# Patient Record
Sex: MALE | Race: WHITE | NOT HISPANIC OR LATINO | Employment: FULL TIME | URBAN - METROPOLITAN AREA
[De-identification: names, ages, dates, MRNs, and addresses within clinical notes are randomized per-mention and may not be internally consistent; named-entity substitution may affect disease eponyms.]

---

## 2018-07-31 ENCOUNTER — OFFICE VISIT CONVERTED (OUTPATIENT)
Dept: UROLOGY | Facility: CLINIC | Age: 33
End: 2018-07-31
Attending: UROLOGY

## 2018-08-10 ENCOUNTER — OFFICE VISIT CONVERTED (OUTPATIENT)
Dept: UROLOGY | Facility: CLINIC | Age: 33
End: 2018-08-10
Attending: UROLOGY

## 2021-05-16 VITALS
BODY MASS INDEX: 27.32 KG/M2 | TEMPERATURE: 97.8 F | SYSTOLIC BLOOD PRESSURE: 126 MMHG | HEART RATE: 107 BPM | HEIGHT: 66 IN | WEIGHT: 170 LBS | DIASTOLIC BLOOD PRESSURE: 72 MMHG

## 2021-05-16 VITALS
HEART RATE: 83 BPM | TEMPERATURE: 98.3 F | DIASTOLIC BLOOD PRESSURE: 95 MMHG | BODY MASS INDEX: 26.68 KG/M2 | HEIGHT: 66 IN | SYSTOLIC BLOOD PRESSURE: 124 MMHG | WEIGHT: 166 LBS

## 2023-04-12 NOTE — PROGRESS NOTES
Chief Complaint: Testicle Pain    Subjective         History of Present Illness  Nikita Thakur is a 37 y.o. male presents to [facility] to be seen for right testicular pain.    Patient was seen by his PCP on 3/20/2023 with complaints of scrotal pain.  Patient reported that he had been seen in [facility] 3 days prior to this visit for an increase in pain.  He was referred here for evaluation.  He was started on doxycycline for 10 days.     Patient reports he was off for surgery in his right ear and while he was at home he started having pain in right testicle. He reports the pain started going up his right groin and down his right leg. He reports every test in ER was negative. He was told was a type of infection. He was advised to elevate testicles and ice them. He then had a CT scan that was negative. He reports the pain is better but still there when he sits down. He completed doxycycline 1-2 weeks ago.     Denies urinary symptoms    Denies perineal pain    Denies dysuria    Denies GH     surgeries- denies    Family history of  malignancy- denies    Cardiopulmonary- denies    Anticoagulants- denies    Smoker- denies    Ultrasound testicles and scrotum  3/17/2023 normal size of right testicle.  No demonstrated right testicular mass or cyst.  Normal arterial and normal venous vascularity.  The epididymis is normal in size.  Normal vascularity of the epididymis.  No demonstrated epididymal cystic structure.  No demonstrated hydrocele.  No demonstrated varicocele.  No extratesticular mass or cyst.  Left testicle normal size of the left testicle.  Normal arterial and normal venous vascularity.  No demonstrated left testicular mass or cyst.  Epididymis is normal in size.  There is normal vascularity of the epididymis.  There is no demonstrated epididymal cystic structure.  There is no demonstrated hydrocele.  There is prominent extratesticular veins consistent with a varicocele.  There  "is no demonstrated extratesticular mass or cyst    Objective     History reviewed. No pertinent past medical history.    History reviewed. No pertinent surgical history.      Current Outpatient Medications:   •  insulin NPH (NovoLIN N) 100 UNIT/ML injection, 32 units Subcutaneous bid, Disp: , Rfl:   •  insulin regular (NovoLIN R) 100 UNIT/ML injection, 6 units Injection bid, Disp: , Rfl:     Allergies   Allergen Reactions   • Cephalexin Unknown - Low Severity   • Sulfamethoxazole-Trimethoprim Unknown - Low Severity        No family history on file.    Social History     Socioeconomic History   • Marital status:    Tobacco Use   • Smoking status: Never     Passive exposure: Never   • Smokeless tobacco: Never   Vaping Use   • Vaping Use: Never used       Vital Signs:   Resp 16   Ht 167.6 cm (66\")   Wt 85.8 kg (189 lb 3.2 oz)   BMI 30.54 kg/m²      Physical Exam  Vitals reviewed.   Constitutional:       Appearance: Normal appearance.   Abdominal:      Hernia: There is no hernia in the left inguinal area or right inguinal area.   Genitourinary:     Penis: Circumcised.       Testes: Normal.         Right: Swelling not present.         Left: Swelling not present.      Epididymis:      Right: Normal.      Left: Normal.   Skin:     General: Skin is warm and dry.   Neurological:      General: No focal deficit present.      Mental Status: He is alert and oriented to person, place, and time.   Psychiatric:         Mood and Affect: Mood normal.         Behavior: Behavior normal.          Result Review :   The following data was reviewed by: TRACEY Worthy on 04/21/2023:  No results found for this or any previous visit.         Procedures        Assessment and Plan    Diagnoses and all orders for this visit:    1. Scrotal pain (Primary)    2. Varicocele    Since patient's pain seems to have improved, and his ultrasound was negative except for left varicocele, we will just follow-up as needed with him.  He " does have a longstanding history of back pain and he and his PCP are speculating that the pain in his right groin could be from his back pain.  No inguinal hernia appreciated on exam today or on CT that he had previously completed on 4/11/2023.    He can make an appointment for follow-up if his pain returns    Follow Up   Return if symptoms worsen or fail to improve.  Patient was given instructions and counseling regarding his condition or for health maintenance advice. Please see specific information pulled into the AVS if appropriate.         This document has been electronically signed by TRACEY Worthy  April 21, 2023 14:51 EDT

## 2023-04-21 ENCOUNTER — OFFICE VISIT (OUTPATIENT)
Dept: UROLOGY | Facility: CLINIC | Age: 38
End: 2023-04-21
Payer: COMMERCIAL

## 2023-04-21 VITALS — WEIGHT: 189.2 LBS | BODY MASS INDEX: 30.41 KG/M2 | HEIGHT: 66 IN | RESPIRATION RATE: 16 BRPM

## 2023-04-21 DIAGNOSIS — I86.1 VARICOCELE: ICD-10-CM

## 2023-04-21 DIAGNOSIS — N50.82 SCROTAL PAIN: Primary | ICD-10-CM

## 2023-04-21 RX ORDER — HUMAN INSULIN 100 [IU]/ML
INJECTION, SOLUTION SUBCUTANEOUS
COMMUNITY

## 2023-04-21 RX ORDER — HUMAN INSULIN 100 [IU]/ML
INJECTION, SUSPENSION SUBCUTANEOUS
COMMUNITY

## 2024-08-22 ENCOUNTER — HOSPITAL ENCOUNTER (EMERGENCY)
Facility: HOSPITAL | Age: 39
Discharge: HOME | End: 2024-08-22
Attending: EMERGENCY MEDICINE
Payer: COMMERCIAL

## 2024-08-22 ENCOUNTER — APPOINTMENT (OUTPATIENT)
Dept: CT IMAGING | Facility: HOSPITAL | Age: 39
End: 2024-08-22
Payer: COMMERCIAL

## 2024-08-22 VITALS
HEIGHT: 66 IN | OXYGEN SATURATION: 97 % | DIASTOLIC BLOOD PRESSURE: 78 MMHG | RESPIRATION RATE: 18 BRPM | WEIGHT: 189.6 LBS | TEMPERATURE: 97.8 F | BODY MASS INDEX: 30.47 KG/M2 | SYSTOLIC BLOOD PRESSURE: 125 MMHG | HEART RATE: 79 BPM

## 2024-08-22 DIAGNOSIS — R10.10 UPPER ABDOMINAL PAIN: Primary | ICD-10-CM

## 2024-08-22 LAB
ALBUMIN SERPL-MCNC: 3.9 G/DL (ref 3.5–5.2)
ALBUMIN/GLOB SERPL: 1.2 G/DL
ALP SERPL-CCNC: 68 U/L (ref 39–117)
ALT SERPL W P-5'-P-CCNC: 31 U/L (ref 1–41)
ANION GAP SERPL CALCULATED.3IONS-SCNC: 10.1 MMOL/L (ref 5–15)
AST SERPL-CCNC: 19 U/L (ref 1–40)
BASOPHILS # BLD AUTO: 0.05 10*3/MM3 (ref 0–0.2)
BASOPHILS NFR BLD AUTO: 1 % (ref 0–1.5)
BILIRUB SERPL-MCNC: 0.3 MG/DL (ref 0–1.2)
BILIRUB UR QL STRIP: NEGATIVE
BUN SERPL-MCNC: 10 MG/DL (ref 6–20)
BUN/CREAT SERPL: 11.4 (ref 7–25)
CALCIUM SPEC-SCNC: 8.8 MG/DL (ref 8.6–10.5)
CHLORIDE SERPL-SCNC: 104 MMOL/L (ref 98–107)
CLARITY UR: CLEAR
CO2 SERPL-SCNC: 26.9 MMOL/L (ref 22–29)
COLOR UR: YELLOW
CREAT SERPL-MCNC: 0.88 MG/DL (ref 0.76–1.27)
DEPRECATED RDW RBC AUTO: 38.8 FL (ref 37–54)
EGFRCR SERPLBLD CKD-EPI 2021: 112.9 ML/MIN/1.73
EOSINOPHIL # BLD AUTO: 0.25 10*3/MM3 (ref 0–0.4)
EOSINOPHIL NFR BLD AUTO: 5.1 % (ref 0.3–6.2)
ERYTHROCYTE [DISTWIDTH] IN BLOOD BY AUTOMATED COUNT: 12.5 % (ref 12.3–15.4)
GLOBULIN UR ELPH-MCNC: 3.2 GM/DL
GLUCOSE SERPL-MCNC: 172 MG/DL (ref 65–99)
GLUCOSE UR STRIP-MCNC: ABNORMAL MG/DL
HCT VFR BLD AUTO: 48.5 % (ref 37.5–51)
HGB BLD-MCNC: 16.2 G/DL (ref 13–17.7)
HGB UR QL STRIP.AUTO: NEGATIVE
HOLD SPECIMEN: NORMAL
HOLD SPECIMEN: NORMAL
IMM GRANULOCYTES # BLD AUTO: 0.01 10*3/MM3 (ref 0–0.05)
IMM GRANULOCYTES NFR BLD AUTO: 0.2 % (ref 0–0.5)
KETONES UR QL STRIP: ABNORMAL
LEUKOCYTE ESTERASE UR QL STRIP.AUTO: NEGATIVE
LIPASE SERPL-CCNC: 14 U/L (ref 13–60)
LYMPHOCYTES # BLD AUTO: 1.56 10*3/MM3 (ref 0.7–3.1)
LYMPHOCYTES NFR BLD AUTO: 32 % (ref 19.6–45.3)
MAGNESIUM SERPL-MCNC: 1.9 MG/DL (ref 1.6–2.6)
MCH RBC QN AUTO: 28.2 PG (ref 26.6–33)
MCHC RBC AUTO-ENTMCNC: 33.4 G/DL (ref 31.5–35.7)
MCV RBC AUTO: 84.5 FL (ref 79–97)
MONOCYTES # BLD AUTO: 0.53 10*3/MM3 (ref 0.1–0.9)
MONOCYTES NFR BLD AUTO: 10.9 % (ref 5–12)
NEUTROPHILS NFR BLD AUTO: 2.47 10*3/MM3 (ref 1.7–7)
NEUTROPHILS NFR BLD AUTO: 50.8 % (ref 42.7–76)
NITRITE UR QL STRIP: NEGATIVE
NRBC BLD AUTO-RTO: 0 /100 WBC (ref 0–0.2)
PH UR STRIP.AUTO: 5.5 [PH] (ref 5–8)
PLATELET # BLD AUTO: 289 10*3/MM3 (ref 140–450)
PMV BLD AUTO: 8.9 FL (ref 6–12)
POTASSIUM SERPL-SCNC: 4.7 MMOL/L (ref 3.5–5.2)
PROT SERPL-MCNC: 7.1 G/DL (ref 6–8.5)
PROT UR QL STRIP: NEGATIVE
RBC # BLD AUTO: 5.74 10*6/MM3 (ref 4.14–5.8)
SODIUM SERPL-SCNC: 141 MMOL/L (ref 136–145)
SP GR UR STRIP: >1.03 (ref 1–1.03)
UROBILINOGEN UR QL STRIP: ABNORMAL
WBC NRBC COR # BLD AUTO: 4.87 10*3/MM3 (ref 3.4–10.8)
WHOLE BLOOD HOLD COAG: NORMAL
WHOLE BLOOD HOLD SPECIMEN: NORMAL

## 2024-08-22 PROCEDURE — 25510000001 IOPAMIDOL PER 1 ML: Performed by: EMERGENCY MEDICINE

## 2024-08-22 PROCEDURE — 96374 THER/PROPH/DIAG INJ IV PUSH: CPT

## 2024-08-22 PROCEDURE — 85025 COMPLETE CBC W/AUTO DIFF WBC: CPT

## 2024-08-22 PROCEDURE — 83690 ASSAY OF LIPASE: CPT

## 2024-08-22 PROCEDURE — 25810000003 SODIUM CHLORIDE 0.9 % SOLUTION: Performed by: EMERGENCY MEDICINE

## 2024-08-22 PROCEDURE — 36415 COLL VENOUS BLD VENIPUNCTURE: CPT

## 2024-08-22 PROCEDURE — 74177 CT ABD & PELVIS W/CONTRAST: CPT

## 2024-08-22 PROCEDURE — 81003 URINALYSIS AUTO W/O SCOPE: CPT | Performed by: EMERGENCY MEDICINE

## 2024-08-22 PROCEDURE — 80053 COMPREHEN METABOLIC PANEL: CPT

## 2024-08-22 PROCEDURE — 99285 EMERGENCY DEPT VISIT HI MDM: CPT

## 2024-08-22 PROCEDURE — 96361 HYDRATE IV INFUSION ADD-ON: CPT

## 2024-08-22 PROCEDURE — 83735 ASSAY OF MAGNESIUM: CPT | Performed by: EMERGENCY MEDICINE

## 2024-08-22 PROCEDURE — 25010000002 ONDANSETRON PER 1 MG: Performed by: EMERGENCY MEDICINE

## 2024-08-22 PROCEDURE — 63710000001 INSULIN LISPRO (HUMAN) PER 5 UNITS: Performed by: EMERGENCY MEDICINE

## 2024-08-22 RX ORDER — SODIUM CHLORIDE 0.9 % (FLUSH) 0.9 %
10 SYRINGE (ML) INJECTION AS NEEDED
Status: DISCONTINUED | OUTPATIENT
Start: 2024-08-22 | End: 2024-08-22 | Stop reason: HOSPADM

## 2024-08-22 RX ORDER — ESOMEPRAZOLE MAGNESIUM 40 MG/1
40 CAPSULE, DELAYED RELEASE ORAL
Qty: 30 CAPSULE | Refills: 1 | Status: SHIPPED | OUTPATIENT
Start: 2024-08-22

## 2024-08-22 RX ORDER — INSULIN LISPRO 100 [IU]/ML
10 INJECTION, SOLUTION INTRAVENOUS; SUBCUTANEOUS ONCE
Status: COMPLETED | OUTPATIENT
Start: 2024-08-22 | End: 2024-08-22

## 2024-08-22 RX ORDER — ONDANSETRON 2 MG/ML
4 INJECTION INTRAMUSCULAR; INTRAVENOUS ONCE
Status: COMPLETED | OUTPATIENT
Start: 2024-08-22 | End: 2024-08-22

## 2024-08-22 RX ORDER — IOPAMIDOL 755 MG/ML
100 INJECTION, SOLUTION INTRAVASCULAR
Status: COMPLETED | OUTPATIENT
Start: 2024-08-22 | End: 2024-08-22

## 2024-08-22 RX ADMIN — SODIUM CHLORIDE 1000 ML: 9 INJECTION, SOLUTION INTRAVENOUS at 12:01

## 2024-08-22 RX ADMIN — INSULIN LISPRO 10 UNITS: 100 INJECTION, SOLUTION INTRAVENOUS; SUBCUTANEOUS at 14:49

## 2024-08-22 RX ADMIN — IOPAMIDOL 100 ML: 755 INJECTION, SOLUTION INTRAVENOUS at 12:38

## 2024-08-22 RX ADMIN — ONDANSETRON 4 MG: 2 INJECTION INTRAMUSCULAR; INTRAVENOUS at 12:01

## 2024-08-22 NOTE — ED PROVIDER NOTES
Time: 11:57 AM EDT  Date of encounter:  8/22/2024  Independent Historian/Clinical History and Information was obtained by:   Patient    History is limited by: N/A    Chief Complaint: Abdominal pain      History of Present Illness:  Patient is a 39 y.o. year old male who presents to the emergency department for evaluation of abdominal pain.  Patient has a history of diabetes who presents with complaints abdominal pain.  States he was seen at [facility] earlier in the week and diagnosed with colitis.  Spent a couple days in the hospital due to abdominal pain and vomiting.  He states he also was given an NG tube because of distention of his abdomen.  He also reports that his sugar was running low for couple days.  States that he got better over the last couple days and was discharged yesterday but then ate last night and woke up this morning complaining of some abdominal pain again.  Reports the pain is in the upper abdomen.  Denies any vomiting today.  No other complaints this time.      Patient Care Team  Primary Care Provider: Deepthi Viera MD    Past Medical History:     No Known Allergies  History reviewed. No pertinent past medical history.  History reviewed. No pertinent surgical history.  History reviewed. No pertinent family history.    Home Medications:  Prior to Admission medications    Medication Sig Start Date End Date Taking? Authorizing Provider   insulin NPH (NovoLIN N) 100 UNIT/ML injection 32 units Subcutaneous bid  Patient not taking: Reported on 7/14/2023    Wade Joseph MD   insulin regular (NovoLIN R) 100 UNIT/ML injection 6 units Injection bid  Patient not taking: Reported on 7/14/2023    Wade Joseph MD   Levemir FlexPen 100 UNIT/ML injection  7/6/23   Wade Joseph MD        Social History:   Social History     Tobacco Use    Smoking status: Never     Passive exposure: Never    Smokeless tobacco: Never   Vaping Use    Vaping status: Never Used  "        Review of Systems:  Review of Systems   Gastrointestinal:  Positive for abdominal pain and vomiting.        Physical Exam:  /78   Pulse 79   Temp 97.8 °F (36.6 °C) (Oral)   Resp 18   Ht 167.6 cm (66\")   Wt 86 kg (189 lb 9.5 oz)   SpO2 97%   BMI 30.60 kg/m²     Physical Exam  Vitals and nursing note reviewed.   Constitutional:       Appearance: Normal appearance.   HENT:      Head: Normocephalic and atraumatic.   Eyes:      General: No scleral icterus.  Cardiovascular:      Rate and Rhythm: Normal rate and regular rhythm.      Heart sounds: Normal heart sounds.   Pulmonary:      Effort: Pulmonary effort is normal.      Breath sounds: Normal breath sounds.   Abdominal:      Palpations: Abdomen is soft.      Tenderness: There is abdominal tenderness in the epigastric area.   Musculoskeletal:         General: Normal range of motion.      Cervical back: Normal range of motion.   Skin:     Findings: No rash.   Neurological:      General: No focal deficit present.      Mental Status: He is alert.                  Procedures:  Procedures      Medical Decision Making:      Comorbidities that affect care:    Diabetes    External Notes reviewed:    Reviewed admission from 8/19/2024      The following orders were placed and all results were independently analyzed by me:  Orders Placed This Encounter   Procedures    CT Abdomen Pelvis With Contrast    [place] Draw    Comprehensive Metabolic Panel    Lipase    Urinalysis With Microscopic If Indicated (No Culture) - Urine, Clean Catch    CBC Auto Differential    Magnesium    NPO Diet NPO Type: Strict NPO    Undress & Gown    Insert Peripheral IV    CBC & Differential    Green Top (Gel)    Lavender Top    Gold Top - SST    Light Blue Top       Medications Given in the Emergency Department:  Medications   sodium chloride 0.9 % flush 10 mL (has no administration in time range)   Insulin Lispro (humaLOG) injection 10 Units (has no administration in time range) "   sodium chloride 0.9 % bolus 1,000 mL (0 mL Intravenous Stopped 8/22/24 1434)   ondansetron (ZOFRAN) injection 4 mg (4 mg Intravenous Given 8/22/24 1201)   iopamidol (ISOVUE-370) 76 % injection 100 mL (100 mL Intravenous Given 8/22/24 1238)        ED Course:         Labs:    Lab Results (last 24 hours)       Procedure Component Value Units Date/Time    CBC & Differential [[id number]]  (Normal) Collected: 08/22/24 1041    Specimen: Blood from Arm, Right Updated: 08/22/24 1054    Narrative:      The following orders were created for panel order CBC & Differential.  Procedure                               Abnormality         Status                     ---------                               -----------         ------                     CBC Auto Differential[[id number]]        Normal              Final result                 Please view results for these tests on the individual orders.    Comprehensive Metabolic Panel [[id number]]  (Abnormal) Collected: 08/22/24 1041    Specimen: Blood from Arm, Right Updated: 08/22/24 1114     Glucose 172 mg/dL      BUN 10 mg/dL      Creatinine 0.88 mg/dL      Sodium 141 mmol/L      Potassium 4.7 mmol/L      Chloride 104 mmol/L      CO2 26.9 mmol/L      Calcium 8.8 mg/dL      Total Protein 7.1 g/dL      Albumin 3.9 g/dL      ALT (SGPT) 31 U/L      AST (SGOT) 19 U/L      Alkaline Phosphatase 68 U/L      Total Bilirubin 0.3 mg/dL      Globulin 3.2 gm/dL      A/G Ratio 1.2 g/dL      BUN/Creatinine Ratio 11.4     Anion Gap 10.1 mmol/L      eGFR 112.9 mL/min/1.73     Narrative:      GFR Normal >60  Chronic Kidney Disease <60  Kidney Failure <15      Lipase [[id number]]  (Normal) Collected: 08/22/24 1041    Specimen: Blood from Arm, Right Updated: 08/22/24 1114     Lipase 14 U/L     CBC Auto Differential [[id number]]  (Normal) Collected: 08/22/24 1041    Specimen: Blood from Arm, Right Updated: 08/22/24 1054     WBC 4.87 10*3/mm3      RBC 5.74 10*6/mm3      Hemoglobin 16.2 g/dL      Hematocrit  48.5 %      MCV 84.5 fL      MCH 28.2 pg      MCHC 33.4 g/dL      RDW 12.5 %      RDW-SD 38.8 fl      MPV 8.9 fL      Platelets 289 10*3/mm3      Neutrophil % 50.8 %      Lymphocyte % 32.0 %      Monocyte % 10.9 %      Eosinophil % 5.1 %      Basophil % 1.0 %      Immature Grans % 0.2 %      Neutrophils, Absolute 2.47 10*3/mm3      Lymphocytes, Absolute 1.56 10*3/mm3      Monocytes, Absolute 0.53 10*3/mm3      Eosinophils, Absolute 0.25 10*3/mm3      Basophils, Absolute 0.05 10*3/mm3      Immature Grans, Absolute 0.01 10*3/mm3      nRBC 0.0 /100 WBC     Magnesium [[id number]]  (Normal) Collected: 08/22/24 1041    Specimen: Blood from Arm, Right Updated: 08/22/24 1157     Magnesium 1.9 mg/dL     Urinalysis With Microscopic If Indicated (No Culture) - Urine, Clean Catch [[id number]]  (Abnormal) Collected: 08/22/24 1311    Specimen: Urine, Clean Catch Updated: 08/22/24 1328     Color, UA Yellow     Appearance, UA Clear     pH, UA 5.5     Specific Gravity, UA >1.030     Glucose,  mg/dL (2+)     Ketones, UA 15 mg/dL (1+)     Bilirubin, UA Negative     Blood, UA Negative     Protein, UA Negative     Leuk Esterase, UA Negative     Nitrite, UA Negative     Urobilinogen, UA 0.2 E.U./dL    Narrative:      Urine microscopic not indicated.             Imaging:    CT Abdomen Pelvis With Contrast    Result Date: 8/22/2024  CT ABDOMEN PELVIS W CONTRAST Date of Exam: 8/22/2024 12:31 PM EDT Indication: abdominal pain. Comparison: None available. Technique: Axial CT images were obtained of the abdomen and pelvis after the uneventful intravenous administration of iodinated contrast. Reconstructed coronal and sagittal images were also obtained. Automated exposure control and iterative construction methods were used. Findings: There is mild bibasilar atelectasis. The liver, pancreas, and spleen are within normal limits. Bilateral adrenal glands are within normal limits. There is a small low-density mass of the posterior cortex of  the mid left kidney likely a cyst. Kidneys are otherwise unremarkable. No hydronephrosis. There is no abdominal or retroperitoneal adenopathy. Gallbladder appears normal. Upper GI tract is within normal limits. Pelvis: Urinary bladder is within normal limits. The GI tract including the appendix is within normal limits. There is no pelvic or inguinal adenopathy. No free intraperitoneal fluid. There are degenerative changes of the spine and right sacroiliac joint. There is no acute fracture. No lytic or sclerotic bony lesion identified.     Impression: 1. No acute process seen within the abdomen or pelvis. Electronically Signed: Hank Ferris MD  8/22/2024 12:53 PM EDT  Workstation ID: [id number]       Differential Diagnosis and Discussion:    Abdominal Pain: Based on the patient's signs and symptoms, I considered abdominal aortic aneurysm, small bowel obstruction, pancreatitis, acute cholecystitis, acute appendecitis, peptic ulcer disease, gastritis, colitis, endocrine disorders, irritable bowel syndrome and other differential diagnosis an etiology of the patient's abdominal pain.    All labs were reviewed and interpreted by me.  CT scan radiology impression was interpreted by me.    MDM     Amount and/or Complexity of Data Reviewed  Clinical lab tests: reviewed  Tests in the radiology section of CPT®: reviewed         Patient is a 39-year-old gentleman who presents with complaints of upper abdominal pain.  Reports has been ongoing since Sunday.  He was admitted initially to [facility] a few days ago and was discharged yesterday.  States he was feeling better at that time but then started having belly pain again last night.  On exam he is well-appearing at this time.  CT does not show any acute findings today.  CT from the other day showed more enterocolitis still likely viral in nature.  He is a type I diabetic and his sugar is well-controlled at this time.  He has tolerated p.o. fluids without any difficulty as well  as food.  I will send him out on a PPI as this could just be gastritis related at this time.  Colt do recommend outpatient follow-up with JOLYNN Gregory DC.        Patient Care Considerations:          Consultants/Shared Management Plan:    None    Social Determinants of Health:    Patient is independent, reliable, and has access to care.       Disposition and Care Coordination:    Discharged: The patient is suitable and stable for discharge with no need for consideration of admission.    I have explained the patient´s condition, diagnoses and treatment plan based on the information available to me at this time. I have answered questions and addressed any concerns. The patient has a good  understanding of the patient´s diagnosis, condition, and treatment plan as can be expected at this point. The vital signs have been stable. The patient´s condition is stable and appropriate for discharge from the emergency department.      The patient will pursue further outpatient evaluation with the primary care physician or other designated or consulting physician as outlined in the discharge instructions. They are agreeable to this plan of care and follow-up instructions have been explained in detail. The patient has received these instructions in written format and have expressed an understanding of the discharge instructions. The patient is aware that any significant change in condition or worsening of symptoms should prompt an immediate return to this or the closest emergency department or call to 911.      Final diagnoses:   Upper abdominal pain        ED Disposition       ED Disposition   Discharge    Condition   Stable    Comment   --               This medical record created using voice recognition software.             Gil Lee MD  08/22/24 [id number]

## 2024-11-11 ENCOUNTER — OFFICE VISIT (OUTPATIENT)
Dept: GASTROENTEROLOGY | Facility: CLINIC | Age: 39
End: 2024-11-11
Payer: COMMERCIAL

## 2024-11-11 VITALS
BODY MASS INDEX: 31.24 KG/M2 | WEIGHT: 194.4 LBS | HEART RATE: 73 BPM | DIASTOLIC BLOOD PRESSURE: 73 MMHG | SYSTOLIC BLOOD PRESSURE: 116 MMHG | HEIGHT: 66 IN

## 2024-11-11 DIAGNOSIS — Z86.39 HISTORY OF DIABETES MELLITUS: ICD-10-CM

## 2024-11-11 DIAGNOSIS — R14.0 ABDOMINAL BLOATING: ICD-10-CM

## 2024-11-11 DIAGNOSIS — R68.81 EARLY SATIETY: ICD-10-CM

## 2024-11-11 DIAGNOSIS — K21.9 GASTROESOPHAGEAL REFLUX DISEASE, UNSPECIFIED WHETHER ESOPHAGITIS PRESENT: ICD-10-CM

## 2024-11-11 DIAGNOSIS — R11.2 NAUSEA AND VOMITING, UNSPECIFIED VOMITING TYPE: Primary | ICD-10-CM

## 2024-11-11 PROCEDURE — 99214 OFFICE O/P EST MOD 30 MIN: CPT | Performed by: NURSE PRACTITIONER

## 2024-11-11 RX ORDER — INSULIN ASPART 100 [IU]/ML
INJECTION, SOLUTION INTRAVENOUS; SUBCUTANEOUS
COMMUNITY

## 2024-11-11 RX ORDER — FAMOTIDINE 20 MG/1
20 TABLET, FILM COATED ORAL 2 TIMES DAILY
Qty: 60 TABLET | Refills: 3 | Status: SHIPPED | OUTPATIENT
Start: 2024-11-11

## 2024-11-11 RX ORDER — INSULIN GLARGINE 100 [IU]/ML
INJECTION, SOLUTION SUBCUTANEOUS
COMMUNITY
End: 2024-11-11

## 2024-11-11 NOTE — PROGRESS NOTES
Chief Complaint        delayed gastric emptying (Inpt follow up)    History of Present Illness      Nikita Thakur is a 39 y.o. male who presents to [facility] as a new patient for abdominal swelling.       He was admitted to the hospital at [facility] in August for low blood sugar.  He had a CT scan of the abdomen pelvis done while he was there on 8/19/2024 that showed distention of stomach and bowel loops suggesting enterocolitis or ileus.    He had a repeat CT scan of the abdomen pelvis 3 days later on 8/22/2024 that was negative for any acute abdominal process.  Labs while in the hospital showed a glucose of 172, LFTs were normal hemoglobin A1c was 8.2.  Lipase normal.    He is continuing to have worsening reflux and bloating. Denies any dysphagia. He has 2-4 BMS a day.  He has a lifelong history of diabetes.  He tells me his symptoms have just been getting worse lately.  He does admit he does eat a lot of fast food.  His bowels are moving better.  No longer having constipation.  Was on Nexium for short time for his reflux symptoms.  It was helpful but he ran out of it.    Last colonoscopy---10-15 years ago due to rectal bleeding. It was normal. Never had EGD.     GI [facility]----Father with pancreatitis.     Results       Result Review :   The following data was reviewed by: TRACEY Cai on 11/11/2024     CMP          8/22/2024    10:41   CMP   Glucose 172    BUN 10    Creatinine 0.88    EGFR 112.9    Sodium 141    Potassium 4.7    Chloride 104    Calcium 8.8    Total Protein 7.1    Albumin 3.9    Globulin 3.2    Total Bilirubin 0.3    Alkaline Phosphatase 68    AST (SGOT) 19    ALT (SGPT) 31    Albumin/Globulin Ratio 1.2    BUN/Creatinine Ratio 11.4    Anion Gap 10.1      CBC          8/22/2024    10:41   CBC   WBC 4.87    RBC 5.74    Hemoglobin 16.2    Hematocrit 48.5    MCV 84.5    MCH 28.2    MCHC 33.4    RDW 12.5    Platelets 289      CBC w/diff           "8/22/2024    10:41   CBC w/Diff   WBC 4.87    RBC 5.74    Hemoglobin 16.2    Hematocrit 48.5    MCV 84.5    MCH 28.2    MCHC 33.4    RDW 12.5    Platelets 289    Neutrophil Rel % 50.8    Immature Granulocyte Rel % 0.2    Lymphocyte Rel % 32.0    Monocyte Rel % 10.9    Eosinophil Rel % 5.1    Basophil Rel % 1.0            Lipase   Lipase   Date Value Ref Range Status   08/22/2024 14 13 - 60 U/L Final     Amylase No results found for: \"AMYLASE\"  Iron Profile No results found for: \"IRON\", \"TIBC\", \"LABIRON\", \"TRANSFERRIN\"  Ferritin No results found for: \"FERRITIN\"  ESR (Sed Rate) No results found for: \"SEDRATE\"  CRP (C-Reactive) No results found for: \"CRP\"  Liver Workup No results found for: \"AFPTM\", \"DSDNA\", \"EXPANDEDENA\", \"SMOOTHMUSCAB\", \"CERULOPLSM\", \"FERRITIN\", \"LABIMMURE\", \"TOTIGGREF\", \"IGA\", \"IGM\", \"IRON\", \"TIBC\", \"LABIRON\", \"TRANSFERRIN\", \"MITOAB\", \"PROTIME\", \"INR\", \"AFP\"         Past Medical History       Past Medical History:   Diagnosis Date    Diabetes mellitus        Past Surgical History:   Procedure Laterality Date    COLONOSCOPY           Current Outpatient Medications:     insulin NPH (NovoLIN N) 100 UNIT/ML injection, , Disp: , Rfl:     insulin regular (NovoLIN R) 100 UNIT/ML injection, , Disp: , Rfl:     Levemir FlexPen 100 UNIT/ML injection, , Disp: , Rfl:     NovoLOG FlexPen 100 UNIT/ML solution pen-injector sc pen, sliding scale Subcutaneous for 30 days, Disp: , Rfl:     famotidine (PEPCID) 20 MG tablet, Take 1 tablet by mouth 2 (Two) Times a Day., Disp: 60 tablet, Rfl: 3     No Known Allergies    Family History   Problem Relation Age of Onset    Skin cancer Father         Social History     Social History Narrative    Not on file       Objective       Objective     Vital Signs:   /73 (BP Location: Left arm, Patient Position: Sitting, Cuff Size: Large Adult)   Pulse 73   Ht 167.6 cm (66\")   Wt 88.2 kg (194 lb 6.4 oz)   BMI 31.38 kg/m²     Body mass index is 31.38 kg/m².    Review of " Systems   Constitutional:  Positive for appetite change. Negative for chills, diaphoresis, fatigue, fever and unexpected weight change.   HENT:  Negative for nosebleeds, postnasal drip, sore throat, trouble swallowing and voice change.    Respiratory:  Negative for cough, choking, chest tightness, shortness of breath, wheezing and stridor.    Cardiovascular:  Negative for chest pain, palpitations and leg swelling.   Gastrointestinal:  Positive for abdominal distention, abdominal pain, nausea and vomiting. Negative for anal bleeding, blood in stool, constipation, diarrhea and rectal pain.   Endocrine: Negative for polydipsia, polyphagia and polyuria.   Musculoskeletal:  Negative for gait problem.   Skin:  Negative for rash and wound.   Allergic/Immunologic: Negative for food allergies.   Neurological:  Negative for dizziness, speech difficulty and light-headedness.   Psychiatric/Behavioral:  Negative for confusion, self-injury, sleep disturbance and suicidal ideas.         Physical Exam         Assessment & Plan          Assessment and Plan    Diagnoses and all orders for this visit:    1. Nausea and vomiting, unspecified vomiting type (Primary)  -     NM Gastric Emptying; Future    2. Early satiety  -     NM Gastric Emptying; Future    3. Gastroesophageal reflux disease, unspecified whether esophagitis present  -     famotidine (PEPCID) 20 MG tablet; Take 1 tablet by mouth 2 (Two) Times a Day.  Dispense: 60 tablet; Refill: 3  -     NM Gastric Emptying; Future    4. Abdominal bloating  -     NM Gastric Emptying; Future    5. History of diabetes mellitus  -     NM Gastric Emptying; Future    Reviewed medical history with him today.  His wife was on the phone during the visit with us.  Sounds like he may very well have gastroparesis and GERD.  Will start with gastric emptying study for further evaluation.  Will give him gastroparesis handouts to read over.  Recommend small frequent meals.  Patient to continue to work  on glucose control.  GERD is not well-controlled currently.  Will start him on Pepcid and see how he does.  May have to consider an EGD at some point.  Bowels are moving better currently.  Patient to call the office with any issues.  Patient to follow-up with me once we get his testing done.  Patient is agreeable to the plan.            Follow Up       Follow Up   Return in about 3 months (around 2/11/2025) for GERD, BLOATING.  Patient was given instructions and counseling regarding his condition or for health maintenance advice. Please see specific information pulled into the AVS if appropriate.

## 2024-11-21 ENCOUNTER — TELEPHONE (OUTPATIENT)
Dept: GASTROENTEROLOGY | Facility: CLINIC | Age: 39
End: 2024-11-21
Payer: COMMERCIAL

## 2024-11-21 NOTE — TELEPHONE ENCOUNTER
Patient contacted the office about this testing that was sent to [facility], advised patient that the order had been sent and per the documentation I could view it was indexed into their system on Tuesday 11/19/2024. Asked patient if he would like me to call and get this scheduled or if he would rather call himself. He stated he has family member that works there so he might just have them follow up for him. Advised that if he needs me to call or follow up just to call me back.

## 2024-12-02 ENCOUNTER — TELEPHONE (OUTPATIENT)
Dept: GASTROENTEROLOGY | Facility: CLINIC | Age: 39
End: 2024-12-02
Payer: COMMERCIAL

## 2024-12-19 ENCOUNTER — TELEPHONE (OUTPATIENT)
Dept: GASTROENTEROLOGY | Facility: CLINIC | Age: 39
End: 2024-12-19
Payer: COMMERCIAL

## 2024-12-20 ENCOUNTER — TELEPHONE (OUTPATIENT)
Dept: GASTROENTEROLOGY | Facility: CLINIC | Age: 39
End: 2024-12-20
Payer: COMMERCIAL

## 2025-02-11 ENCOUNTER — TELEPHONE (OUTPATIENT)
Dept: GASTROENTEROLOGY | Facility: CLINIC | Age: 40
End: 2025-02-11

## 2025-02-11 NOTE — TELEPHONE ENCOUNTER
I attempted to contact  Nikita Thakur 1985 regarding the appointment no show with TRACEY Hou on 02/11/25@8:45. Patient is aware that there is a 24-hour cancellation policy and understands that a no-show letter will be mailed to them at the address on file.Left message if patient would like to reschedule, give the office a call.